# Patient Record
Sex: FEMALE | Race: WHITE | NOT HISPANIC OR LATINO | Employment: FULL TIME | URBAN - METROPOLITAN AREA
[De-identification: names, ages, dates, MRNs, and addresses within clinical notes are randomized per-mention and may not be internally consistent; named-entity substitution may affect disease eponyms.]

---

## 2022-10-27 ENCOUNTER — HOSPITAL ENCOUNTER (EMERGENCY)
Facility: CLINIC | Age: 22
Discharge: HOME | End: 2022-10-27
Attending: EMERGENCY MEDICINE | Admitting: EMERGENCY MEDICINE
Payer: COMMERCIAL

## 2022-10-27 ENCOUNTER — APPOINTMENT (OUTPATIENT)
Dept: CARDIOLOGY | Facility: CLINIC | Age: 22
End: 2022-10-27
Attending: EMERGENCY MEDICINE
Payer: COMMERCIAL

## 2022-10-27 VITALS
WEIGHT: 180 LBS | HEART RATE: 80 BPM | RESPIRATION RATE: 23 BRPM | HEIGHT: 69 IN | BODY MASS INDEX: 26.66 KG/M2 | DIASTOLIC BLOOD PRESSURE: 73 MMHG | SYSTOLIC BLOOD PRESSURE: 123 MMHG | TEMPERATURE: 98.1 F | OXYGEN SATURATION: 100 %

## 2022-10-27 DIAGNOSIS — R00.2 PALPITATIONS: ICD-10-CM

## 2022-10-27 DIAGNOSIS — R55 SYNCOPE AND COLLAPSE: ICD-10-CM

## 2022-10-27 DIAGNOSIS — R00.0 TACHYCARDIA: ICD-10-CM

## 2022-10-27 LAB
ALBUMIN SERPL-MCNC: 4.1 G/DL (ref 3.4–5)
ALBUMIN UR-MCNC: NEGATIVE MG/DL
ALP SERPL-CCNC: 74 U/L (ref 40–150)
ALT SERPL W P-5'-P-CCNC: 23 U/L (ref 0–50)
ANION GAP SERPL CALCULATED.3IONS-SCNC: 6 MMOL/L (ref 3–14)
APPEARANCE UR: CLEAR
AST SERPL W P-5'-P-CCNC: 11 U/L (ref 0–45)
ATRIAL RATE - MUSE: 109 BPM
BACTERIA #/AREA URNS HPF: ABNORMAL /HPF
BASOPHILS # BLD AUTO: 0.1 10E3/UL (ref 0–0.2)
BASOPHILS NFR BLD AUTO: 1 %
BILIRUB SERPL-MCNC: 0.2 MG/DL (ref 0.2–1.3)
BILIRUB UR QL STRIP: NEGATIVE
BUN SERPL-MCNC: 11 MG/DL (ref 7–30)
CALCIUM SERPL-MCNC: 9.2 MG/DL (ref 8.5–10.1)
CHLORIDE BLD-SCNC: 108 MMOL/L (ref 94–109)
CO2 SERPL-SCNC: 25 MMOL/L (ref 20–32)
COLOR UR AUTO: ABNORMAL
CREAT SERPL-MCNC: 0.71 MG/DL (ref 0.52–1.04)
DIASTOLIC BLOOD PRESSURE - MUSE: NORMAL MMHG
EOSINOPHIL # BLD AUTO: 0.1 10E3/UL (ref 0–0.7)
EOSINOPHIL NFR BLD AUTO: 1 %
ERYTHROCYTE [DISTWIDTH] IN BLOOD BY AUTOMATED COUNT: 11.5 % (ref 10–15)
GFR SERPL CREATININE-BSD FRML MDRD: >90 ML/MIN/1.73M2
GLUCOSE BLD-MCNC: 98 MG/DL (ref 70–99)
GLUCOSE UR STRIP-MCNC: NEGATIVE MG/DL
HCT VFR BLD AUTO: 40.4 % (ref 35–47)
HGB BLD-MCNC: 13.4 G/DL (ref 11.7–15.7)
HGB UR QL STRIP: NEGATIVE
IMM GRANULOCYTES # BLD: 0 10E3/UL
IMM GRANULOCYTES NFR BLD: 0 %
INTERPRETATION ECG - MUSE: NORMAL
KETONES UR STRIP-MCNC: NEGATIVE MG/DL
LEUKOCYTE ESTERASE UR QL STRIP: NEGATIVE
LYMPHOCYTES # BLD AUTO: 2.2 10E3/UL (ref 0.8–5.3)
LYMPHOCYTES NFR BLD AUTO: 28 %
MCH RBC QN AUTO: 30.8 PG (ref 26.5–33)
MCHC RBC AUTO-ENTMCNC: 33.2 G/DL (ref 31.5–36.5)
MCV RBC AUTO: 93 FL (ref 78–100)
MONOCYTES # BLD AUTO: 0.5 10E3/UL (ref 0–1.3)
MONOCYTES NFR BLD AUTO: 7 %
NEUTROPHILS # BLD AUTO: 5.1 10E3/UL (ref 1.6–8.3)
NEUTROPHILS NFR BLD AUTO: 63 %
NITRATE UR QL: NEGATIVE
NRBC # BLD AUTO: 0 10E3/UL
NRBC BLD AUTO-RTO: 0 /100
P AXIS - MUSE: 59 DEGREES
PH UR STRIP: 7.5 [PH] (ref 5–7)
PLATELET # BLD AUTO: 231 10E3/UL (ref 150–450)
POTASSIUM BLD-SCNC: 4.1 MMOL/L (ref 3.4–5.3)
PR INTERVAL - MUSE: 130 MS
PROT SERPL-MCNC: 7.9 G/DL (ref 6.8–8.8)
QRS DURATION - MUSE: 82 MS
QT - MUSE: 324 MS
QTC - MUSE: 436 MS
R AXIS - MUSE: 45 DEGREES
RBC # BLD AUTO: 4.35 10E6/UL (ref 3.8–5.2)
RBC URINE: 1 /HPF
SODIUM SERPL-SCNC: 139 MMOL/L (ref 133–144)
SP GR UR STRIP: 1 (ref 1–1.03)
SQUAMOUS EPITHELIAL: 1 /HPF
SYSTOLIC BLOOD PRESSURE - MUSE: NORMAL MMHG
T AXIS - MUSE: 10 DEGREES
TROPONIN I SERPL HS-MCNC: <3 NG/L
TSH SERPL DL<=0.005 MIU/L-ACNC: 2.1 MU/L (ref 0.4–4)
UROBILINOGEN UR STRIP-MCNC: NORMAL MG/DL
VENTRICULAR RATE- MUSE: 109 BPM
WBC # BLD AUTO: 8 10E3/UL (ref 4–11)
WBC URINE: 1 /HPF

## 2022-10-27 PROCEDURE — 81001 URINALYSIS AUTO W/SCOPE: CPT | Performed by: EMERGENCY MEDICINE

## 2022-10-27 PROCEDURE — 93242 EXT ECG>48HR<7D RECORDING: CPT

## 2022-10-27 PROCEDURE — 99284 EMERGENCY DEPT VISIT MOD MDM: CPT

## 2022-10-27 PROCEDURE — 85025 COMPLETE CBC W/AUTO DIFF WBC: CPT | Performed by: EMERGENCY MEDICINE

## 2022-10-27 PROCEDURE — 80053 COMPREHEN METABOLIC PANEL: CPT | Performed by: EMERGENCY MEDICINE

## 2022-10-27 PROCEDURE — 93005 ELECTROCARDIOGRAM TRACING: CPT

## 2022-10-27 PROCEDURE — 36415 COLL VENOUS BLD VENIPUNCTURE: CPT | Performed by: EMERGENCY MEDICINE

## 2022-10-27 PROCEDURE — 84484 ASSAY OF TROPONIN QUANT: CPT | Performed by: EMERGENCY MEDICINE

## 2022-10-27 PROCEDURE — 93248 EXT ECG>7D<15D REV&INTERPJ: CPT | Performed by: INTERNAL MEDICINE

## 2022-10-27 PROCEDURE — 84443 ASSAY THYROID STIM HORMONE: CPT | Performed by: EMERGENCY MEDICINE

## 2022-10-27 ASSESSMENT — ENCOUNTER SYMPTOMS
DIZZINESS: 0
PALPITATIONS: 1
LIGHT-HEADEDNESS: 1

## 2022-10-27 ASSESSMENT — ACTIVITIES OF DAILY LIVING (ADL): ADLS_ACUITY_SCORE: 35

## 2022-10-27 NOTE — ED TRIAGE NOTES
Pt has been worked up for POTS and hasn't had any episode x 2 years. Today was at work, felt dizzy, looked at watch, noticed heart rate went down to 40 and then up to 200 then woke up on floor. Denies hitting head but did hit R shoulder.      Triage Assessment     Row Name 10/27/22 0936       Respiratory WDL    Respiratory WDL WDL       Cardiac WDL    Cardiac WDL X       Cognitive/Neuro/Behavioral WDL    Cognitive/Neuro/Behavioral WDL WDL

## 2022-10-27 NOTE — ED NOTES
Bed: ED25  Expected date:   Expected time:   Means of arrival:   Comments:  triage   Detail Level: Simple Introduction Text (Please End With A Colon): The following procedure was deferred: Surgical excision Procedure To Be Performed At Next Visit: Biopsy by shave method

## 2022-10-27 NOTE — DISCHARGE INSTRUCTIONS
Return your Zio patch as indicated in the instructions.  We will call you if there is a significant abnormality.  Continue with your normal interventions for your POTS syndrome (hydration, increase salt intake, etc.)

## 2022-10-27 NOTE — ED PROVIDER NOTES
History   Chief Complaint:  Tachycardia and Loss of Consciousness    The history is provided by the patient.      Dinah Fleming is a 22 year old female with history of POTS who presents with tachycardia and loss of consciousness. The patient reports feeling normal when she woke up this morning around 0500, but by around 0600 she began to feel somewhat light-headed. Then while at work, immediately prior to arrival here in the ED, she noticed her light-headedness began to worsen, and checked her heart rate on her smart watch which showed a rate in the 40s, and then shortly thereafter, a rate in the low 200s, after which she briefly lost consciousness. This all happened while seated at her work desk, and she denies hitting her head. She notes that while driving to the ED after this episode, her hear rate stayed in the 150s pretty consistently according to her watch. She says this has happened in the past, but not for at least two years. She recounts that when this has happened in the past, she will normally have multiple episodes within a short period of time. She denies any dizziness or room spinning. No personal history of SVT, atrial fibrillation, or atrial flutter. Patient notes starting Macrobid three days ago for a UTI, though she forgot to take her dose yesterday and today.    Review of Systems   Cardiovascular: Positive for palpitations.   Neurological: Positive for syncope and light-headedness. Negative for dizziness.   All other systems reviewed and are negative.    Allergies:  Calamine    Medications:  Macrobid    Past Medical History:     POTS  UTI  Covid-19    Social History:  PCP: No primary care provider on file.   Presents to the ED alone.   Works in construction management.     Physical Exam     Patient Vitals for the past 24 hrs:   BP Temp Temp src Pulse Resp SpO2 Height Weight   10/27/22 1010 123/73 -- -- 80 23 100 % -- --   10/27/22 1003 133/73 -- -- 96 20 100 % -- --   10/27/22 1000  "133/73 -- -- 98 19 99 % -- --   10/27/22 0952 (!) 144/84 -- -- 92 27 -- -- --   10/27/22 0935 (!) 154/89 98.1  F (36.7  C) Temporal (!) 124 18 100 % 1.753 m (5' 9\") 81.6 kg (180 lb)       Physical Exam  General: Resting comfortably on the gurney  Head:  The scalp, face, and head appear normal  Eyes:  The pupils are equal, round, and reactive to light    There is no nystagmus    Extraocular muscles are intact    Conjunctivae and sclerae are normal  ENT:    The nose is normal    Pinnae are normal    The oropharynx is normal    Uvula is in the midline  Neck:  Normal range of motion    There is no rigidity noted    There is no midline cervical spine pain/tenderness    Trachea is in the midline    No mass is detected  CV:  Tachycardic rate and normal underlying rhythm     Normal S1/S2, no S3/S4    No pathological murmur detected  Resp:  Lungs are clear    There is no tachypnea    Non-labored    No rales    No wheezing   GI:  Abdomen is soft, there is no rigidity    No distension    No tympani    No rebound tenderness     Non-surgical without peritoneal features  MS:  Normal muscular tone    Symmetric motor strength    No major joint effusions    No asymmetric leg swelling, no calf tenderness  Skin:  No rash or acute skin lesions noted  Neuro: Speech is normal and fluent  Psych:  Awake. Alert.      Normal affect.  Appropriate interactions.  Lymph: No anterior cervical lymphadenopathy noted    Emergency Department Course   ECG  ECG taken at 0939, ECG read at 0943  Sinus tachycardia. Nonspecific T wave abnormality.    Rate 109 bpm. ND interval 130 ms. QRS duration 82 ms. QT/QTc 324/436 ms. P-R-T axes 59 45 10.     Laboratory:  Labs Ordered and Resulted from Time of ED Arrival to Time of ED Departure   COMPREHENSIVE METABOLIC PANEL - Normal       Result Value    Sodium 139      Potassium 4.1      Chloride 108      Carbon Dioxide (CO2) 25      Anion Gap 6      Urea Nitrogen 11      Creatinine 0.71      Calcium 9.2      " Glucose 98      Alkaline Phosphatase 74      AST 11      ALT 23      Protein Total 7.9      Albumin 4.1      Bilirubin Total 0.2      GFR Estimate >90     TSH WITH FREE T4 REFLEX - Normal    TSH 2.10     TROPONIN I - Normal    Troponin I High Sensitivity <3     CBC WITH PLATELETS AND DIFFERENTIAL    WBC Count 8.0      RBC Count 4.35      Hemoglobin 13.4      Hematocrit 40.4      MCV 93      MCH 30.8      MCHC 33.2      RDW 11.5      Platelet Count 231      % Neutrophils 63      % Lymphocytes 28      % Monocytes 7      % Eosinophils 1      % Basophils 1      % Immature Granulocytes 0      NRBCs per 100 WBC 0      Absolute Neutrophils 5.1      Absolute Lymphocytes 2.2      Absolute Monocytes 0.5      Absolute Eosinophils 0.1      Absolute Basophils 0.1      Absolute Immature Granulocytes 0.0      Absolute NRBCs 0.0     ROUTINE UA WITH MICROSCOPIC REFLEX TO CULTURE     Emergency Department Course:     Reviewed:  I reviewed nursing notes, vitals and past medical history    Assessments:  0943 I obtained history and examined the patient as noted above.   1044 I rechecked the patient and explained findings. Prepared for discharge.      Disposition:  The patient was discharged to home.     Impression & Plan     Medical Decision Making:  This patient presents to the emergency department with an episode of bradycardia followed by significant tachycardia on the order of about 200 bpm and she also experienced significant palpitations and dizziness during this episode and a syncopal spell.  She did not suffer any significant injuries.  She does have a history of POTS however she notes that when she had episodes her heart rate was only typically 1 30-1 50.  This is the fastest rate that she seen in the past.  I suspect this represents an episode of SVT.  It certainly possible when she has been 150 in the past that that could represent a transient atrial flutter with a 2-1 block.  The differential diagnosis includes  supraventricular dysrhythmias, ventricular dysrhythmias (much less likely), WPW, and a possibility of her POTS syndrome.  Patient will be placed on a Zio patch for 7 days.  She notes that when she often has these episodes they do coalesce around each other.  Hopefully we will be able to catch something.  Her vital signs stabilized in the emergency department spontaneously.  Patient screening blood work and troponin are normal.  Twelve-lead EKG shows no evidence of ischemia or myocardial infarction.  No other life-threatening etiologies were detected.    Diagnosis:    ICD-10-CM    1. Tachycardia  R00.0 Leadless EKG Monitor 3 to 7 Days      2. Palpitations  R00.2 Leadless EKG Monitor 3 to 7 Days      3. Syncope and collapse  R55 Leadless EKG Monitor 3 to 7 Days          Discharge Medications:  None.     Scribe Disclosure:  IMiguel, am serving as a scribe at 9:43 AM on 10/27/2022 to document services personally performed by Lenin Garland MD based on my observations and the provider's statements to me.              Lenin Garland MD  10/27/22 1131

## 2022-10-27 NOTE — LETTER
October 27, 2022      To Whom It May Concern:      Dinah Fleming was seen in our Emergency Department today, 10/27/22.  I expect her condition to improve over the next 2-3 days.  She may return to work/school when improved.    Sincerely,        Lenin Garland MD

## 2022-12-24 ENCOUNTER — HEALTH MAINTENANCE LETTER (OUTPATIENT)
Age: 22
End: 2022-12-24

## 2024-02-03 ENCOUNTER — HEALTH MAINTENANCE LETTER (OUTPATIENT)
Age: 24
End: 2024-02-03

## 2025-03-02 ENCOUNTER — HEALTH MAINTENANCE LETTER (OUTPATIENT)
Age: 25
End: 2025-03-02